# Patient Record
Sex: FEMALE | URBAN - METROPOLITAN AREA
[De-identification: names, ages, dates, MRNs, and addresses within clinical notes are randomized per-mention and may not be internally consistent; named-entity substitution may affect disease eponyms.]

---

## 2020-12-02 ENCOUNTER — APPOINTMENT (RX ONLY)
Dept: URBAN - METROPOLITAN AREA CLINIC 38 | Facility: CLINIC | Age: 40
Setting detail: DERMATOLOGY
End: 2020-12-02

## 2020-12-02 DIAGNOSIS — L65.9 NONSCARRING HAIR LOSS, UNSPECIFIED: ICD-10-CM

## 2020-12-02 DIAGNOSIS — D18.0 HEMANGIOMA: ICD-10-CM

## 2020-12-02 PROBLEM — D18.01 HEMANGIOMA OF SKIN AND SUBCUTANEOUS TISSUE: Status: ACTIVE | Noted: 2020-12-02

## 2020-12-02 PROCEDURE — ? PRESCRIPTION

## 2020-12-02 PROCEDURE — ? ADDITIONAL NOTES

## 2020-12-02 PROCEDURE — ? COUNSELING

## 2020-12-02 PROCEDURE — ? OTHER

## 2020-12-02 PROCEDURE — ? ORDER TESTS

## 2020-12-02 PROCEDURE — 99202 OFFICE O/P NEW SF 15 MIN: CPT

## 2020-12-02 RX ORDER — FLUOCINONIDE 0.5 MG/ML
SOLUTION TOPICAL
Qty: 60 | Refills: 1 | Status: ERX | COMMUNITY
Start: 2020-12-02

## 2020-12-02 RX ADMIN — FLUOCINONIDE: 0.5 SOLUTION TOPICAL at 00:00

## 2020-12-02 ASSESSMENT — LOCATION ZONE DERM
LOCATION ZONE: TRUNK
LOCATION ZONE: SCALP

## 2020-12-02 ASSESSMENT — LOCATION SIMPLE DESCRIPTION DERM
LOCATION SIMPLE: ABDOMEN
LOCATION SIMPLE: RIGHT SCALP

## 2020-12-02 ASSESSMENT — LOCATION DETAILED DESCRIPTION DERM
LOCATION DETAILED: RIGHT MEDIAL FRONTAL SCALP
LOCATION DETAILED: LEFT RIB CAGE

## 2020-12-02 NOTE — PROCEDURE: OTHER
Note Text (......Xxx Chief Complaint.): This diagnosis correlates with the
Detail Level: Detailed
Other (Free Text): She believes this is from her hair dye and scalp treatment.  No visual dermatitis present today and hair appears to be falling out from the roots. Discussed Rogaine if labs are WNL.